# Patient Record
Sex: FEMALE | URBAN - METROPOLITAN AREA
[De-identification: names, ages, dates, MRNs, and addresses within clinical notes are randomized per-mention and may not be internally consistent; named-entity substitution may affect disease eponyms.]

---

## 2017-03-17 ENCOUNTER — APPOINTMENT (OUTPATIENT)
Dept: URBAN - METROPOLITAN AREA SURGERY 9 | Age: 32
Setting detail: DERMATOLOGY
End: 2017-03-17

## 2017-03-17 DIAGNOSIS — L56.5 DISSEMINATED SUPERFICIAL ACTINIC POROKERATOSIS (DSAP): ICD-10-CM

## 2017-03-17 DIAGNOSIS — L91.8 OTHER HYPERTROPHIC DISORDERS OF THE SKIN: ICD-10-CM

## 2017-03-17 DIAGNOSIS — B00.1 HERPESVIRAL VESICULAR DERMATITIS: ICD-10-CM

## 2017-03-17 PROBLEM — D48.5 NEOPLASM OF UNCERTAIN BEHAVIOR OF SKIN: Status: ACTIVE | Noted: 2017-03-17

## 2017-03-17 PROCEDURE — OTHER BIOPSY BY SHAVE METHOD: OTHER

## 2017-03-17 PROCEDURE — 11100: CPT

## 2017-03-17 PROCEDURE — OTHER COUNSELING: OTHER

## 2017-03-17 PROCEDURE — 99213 OFFICE O/P EST LOW 20 MIN: CPT | Mod: 25

## 2017-03-17 PROCEDURE — 88305 TISSUE EXAM BY PATHOLOGIST: CPT | Mod: TC

## 2017-03-17 PROCEDURE — OTHER OTHER: OTHER

## 2017-03-17 PROCEDURE — OTHER SKIN TAG REMOVAL MULTI (COSMETIC): OTHER

## 2017-03-17 PROCEDURE — OTHER TREATMENT REGIMEN: OTHER

## 2017-03-17 PROCEDURE — OTHER PATHOLOGY BILLING: OTHER

## 2017-03-17 ASSESSMENT — LOCATION DETAILED DESCRIPTION DERM
LOCATION DETAILED: RIGHT CLAVICULAR NECK
LOCATION DETAILED: PHILTRUM
LOCATION DETAILED: LEFT SUPERIOR ANTERIOR NECK
LOCATION DETAILED: LEFT ANTERIOR PROXIMAL UPPER ARM
LOCATION DETAILED: LEFT INFERIOR LATERAL NECK
LOCATION DETAILED: LEFT CLAVICULAR NECK

## 2017-03-17 ASSESSMENT — LOCATION SIMPLE DESCRIPTION DERM
LOCATION SIMPLE: RIGHT ANTERIOR NECK
LOCATION SIMPLE: UPPER LIP
LOCATION SIMPLE: LEFT UPPER ARM
LOCATION SIMPLE: LEFT ANTERIOR NECK

## 2017-03-17 ASSESSMENT — LOCATION ZONE DERM
LOCATION ZONE: LIP
LOCATION ZONE: ARM
LOCATION ZONE: NECK

## 2017-03-17 NOTE — PROCEDURE: SKIN TAG REMOVAL MULTI (COSMETIC)
Removed With: liquid nitrogen
Price (Use Numbers Only, No Special Characters Or $): 125
Total Number Of Lesions Treated: 9
Detail Level: Simple
Consent: Written consent was obtained and risks were reviewed including but not limited to infection, bleeding, and allergy to anesthesia, and remote possibility of scarring.
Anesthesia Volume In Cc: 0.5

## 2017-03-17 NOTE — HPI: SKIN LESION (SKIN TAGS)
How Severe Are They?: mild
Is This A New Presentation, Or A Follow-Up?: Skin Lesions
Additional History: Patient presents for cosmetic removal

## 2017-03-17 NOTE — PROCEDURE: TREATMENT REGIMEN
Continue Regimen: Abreeva as needed
Plan: Patient will call for oral treatment if topical not helping
Detail Level: Simple

## 2019-04-16 NOTE — PROCEDURE: OTHER
PHYSICIAN NEXT STEPS:  Review Only    CHIEF COMPLAINT:  Chief Complaint/Protocol Used: Critical Value  Onset: 4/15/2019 at 1722      ASSESSMENT:  ? Onset: 4/15/2019 at 1722  -------------------------------------------------------    DISPOSITION:  Disposition Recommendation: Unspecified  Patient Directed To: Unspecified  Patient Intended Action: Unspecified      DISPOSITION OVERRIDE/PROVIDER CONSULT:  Disposition Override: N/A  Override Source: Unspecified  Consulted with PCP: No  Consulted with On-Call Physician: No    CALLER CONTACT INFO:  Name: FATOU Farmeraliriotrish (Self)  Phone 1: [phone] (Mobile) - Preferred  Phone 2: [phone] (Home Phone)      ENCOUNTER STARTED:  04/16/19 03:00:38 AM  ENCOUNTER ASSIGNED TO/CLOSED BY:  Kaur Caldera @ 04/16/19 03:30:45 AM      -------------------------------------------------------    UNDERSTANDS CARE ADVICE: No    AGREES WITH CARE ADVICE: No    WILL FOLLOW CARE ADVICE: No    -------------------------------------------------------  
Detail Level: Simple
Note Text (......Xxx Chief Complaint.): This diagnosis correlates with the
Other (Free Text): Discussed treatment with LN2 vs. observe and monitor approach. Will review with patient once biopsy results are back. If patient opts for LN2 treatment, will set a follow up appt for her at that time.